# Patient Record
(demographics unavailable — no encounter records)

---

## 2019-04-29 NOTE — MRI
EXAM:

Left knee MRI without contrast:



HISTORY:

Internal derangement left knee, left knee pain for 2 weeks without injury



COMPARISON:

None



FINDINGS:

Multiplanar, multisequence MRI examination of the knees performed.

Moderate joint effusion with distention of the suprapatellar recess as well as a moderate size poplit
eal fossa cyst measuring 2.1 x 2.2 x 4.8 cm.

No evidence for significant articular cartilage loss

Medial meniscus: Complex tear with a flap component involving the posterior horn and posterior body

Lateral meniscus: Unremarkable.

Anterior cruciate ligament:Intact.

Posterior cruciate ligament: Intact.

Medial collateral ligament complex: Intact.

Lateral collateral ligament complex: Intact.

Quadriceps and patellar tendons: Intact.

Extensor mechanism: Unremarkable.

No evidence for acute osteochondral defect or abnormal marrow signal.



IMPRESSION:

Abnormal joint effusion including a popliteal fossa cyst which is slightly complicated in appearance.


Complex tear medial meniscus.



Reported By: Earle Ashraf 

Electronically Signed:  4/29/2019 11:50 AM